# Patient Record
Sex: MALE | Race: OTHER | HISPANIC OR LATINO | URBAN - METROPOLITAN AREA
[De-identification: names, ages, dates, MRNs, and addresses within clinical notes are randomized per-mention and may not be internally consistent; named-entity substitution may affect disease eponyms.]

---

## 2022-08-12 ENCOUNTER — OUTPATIENT (OUTPATIENT)
Dept: OUTPATIENT SERVICES | Facility: HOSPITAL | Age: 27
LOS: 1 days | End: 2022-08-12
Payer: COMMERCIAL

## 2022-08-12 DIAGNOSIS — I10 ESSENTIAL (PRIMARY) HYPERTENSION: ICD-10-CM

## 2022-08-12 PROCEDURE — 93306 TTE W/DOPPLER COMPLETE: CPT

## 2022-08-12 PROCEDURE — 93306 TTE W/DOPPLER COMPLETE: CPT | Mod: 26

## 2022-11-09 PROBLEM — Z00.00 ENCOUNTER FOR PREVENTIVE HEALTH EXAMINATION: Status: ACTIVE | Noted: 2022-11-09

## 2022-12-22 DIAGNOSIS — E55.9 VITAMIN D DEFICIENCY, UNSPECIFIED: ICD-10-CM

## 2022-12-22 DIAGNOSIS — I42.9 CARDIOMYOPATHY, UNSPECIFIED: ICD-10-CM

## 2022-12-22 DIAGNOSIS — K76.0 FATTY (CHANGE OF) LIVER, NOT ELSEWHERE CLASSIFIED: ICD-10-CM

## 2022-12-22 DIAGNOSIS — I51.7 CARDIOMEGALY: ICD-10-CM

## 2022-12-22 DIAGNOSIS — R80.9 PROTEINURIA, UNSPECIFIED: ICD-10-CM

## 2022-12-22 DIAGNOSIS — I10 ESSENTIAL (PRIMARY) HYPERTENSION: ICD-10-CM

## 2022-12-22 DIAGNOSIS — E66.9 OBESITY, UNSPECIFIED: ICD-10-CM

## 2022-12-23 ENCOUNTER — NON-APPOINTMENT (OUTPATIENT)
Age: 27
End: 2022-12-23

## 2022-12-23 ENCOUNTER — APPOINTMENT (OUTPATIENT)
Dept: HEART FAILURE | Facility: CLINIC | Age: 27
End: 2022-12-23
Payer: COMMERCIAL

## 2022-12-23 VITALS
DIASTOLIC BLOOD PRESSURE: 110 MMHG | HEIGHT: 67 IN | WEIGHT: 265.5 LBS | HEART RATE: 94 BPM | TEMPERATURE: 99.6 F | SYSTOLIC BLOOD PRESSURE: 150 MMHG | BODY MASS INDEX: 41.67 KG/M2 | OXYGEN SATURATION: 96 %

## 2022-12-23 VITALS
HEART RATE: 98 BPM | SYSTOLIC BLOOD PRESSURE: 158 MMHG | OXYGEN SATURATION: 96 % | RESPIRATION RATE: 12 BRPM | DIASTOLIC BLOOD PRESSURE: 98 MMHG

## 2022-12-23 VITALS — DIASTOLIC BLOOD PRESSURE: 98 MMHG | SYSTOLIC BLOOD PRESSURE: 160 MMHG

## 2022-12-23 VITALS — SYSTOLIC BLOOD PRESSURE: 142 MMHG | DIASTOLIC BLOOD PRESSURE: 108 MMHG

## 2022-12-23 DIAGNOSIS — Z87.891 PERSONAL HISTORY OF NICOTINE DEPENDENCE: ICD-10-CM

## 2022-12-23 DIAGNOSIS — Z78.9 OTHER SPECIFIED HEALTH STATUS: ICD-10-CM

## 2022-12-23 DIAGNOSIS — I50.20 UNSPECIFIED SYSTOLIC (CONGESTIVE) HEART FAILURE: ICD-10-CM

## 2022-12-23 DIAGNOSIS — Z86.79 PERSONAL HISTORY OF OTHER DISEASES OF THE CIRCULATORY SYSTEM: ICD-10-CM

## 2022-12-23 DIAGNOSIS — Z82.49 FAMILY HISTORY OF ISCHEMIC HEART DISEASE AND OTHER DISEASES OF THE CIRCULATORY SYSTEM: ICD-10-CM

## 2022-12-23 DIAGNOSIS — Z83.3 FAMILY HISTORY OF DIABETES MELLITUS: ICD-10-CM

## 2022-12-23 PROCEDURE — 99205 OFFICE O/P NEW HI 60 MIN: CPT | Mod: 25

## 2022-12-23 PROCEDURE — 93000 ELECTROCARDIOGRAM COMPLETE: CPT

## 2022-12-23 RX ORDER — AMLODIPINE BESYLATE 5 MG/1
5 TABLET ORAL
Qty: 90 | Refills: 1 | Status: DISCONTINUED | COMMUNITY
End: 2022-12-23

## 2022-12-23 RX ORDER — SACUBITRIL AND VALSARTAN 24; 26 MG/1; MG/1
24-26 TABLET, FILM COATED ORAL TWICE DAILY
Qty: 180 | Refills: 3 | Status: ACTIVE | COMMUNITY
Start: 2022-12-23 | End: 1900-01-01

## 2022-12-23 RX ORDER — MULTIVIT-MIN/IRON/FOLIC ACID/K 18-600-40
CAPSULE ORAL
Refills: 0 | Status: ACTIVE | COMMUNITY

## 2022-12-23 RX ORDER — ENALAPRIL MALEATE 20 MG/1
20 TABLET ORAL
Qty: 90 | Refills: 1 | Status: DISCONTINUED | COMMUNITY
End: 2022-12-23

## 2022-12-23 NOTE — HISTORY OF PRESENT ILLNESS
[FreeTextEntry1] : 27-year-old male with HFrEF, longstanding hypertension since age of 12,and obesity presented as for initial heart failure evaluation.\par \par Cardiac history dates back to age of 12 where he was noted to have hypertension after a sports accident.  He has been on enalapril since then.  He notes having a full evaluation by nephrology and cardiology when he was younger for secondary hypertension which was negative.  He recalls having renal ultrasounds.  In 2017 however his PCP had retired and he was unable to get consistent treatment for his hypertension.  He had a hospitalization in June 2022 where he had an episode of palpitations for which he was restarted on hypertension medications.  He was connected with Dr. Clement Murray who is now his primary physician.  On work-up, patient underwent echocardiogram which revealed depressed ejection fraction to 27% in the setting of moderate hypertrophic cardiomyopathy (IVSd 1.45cm). He was referred to the heart failure clinic for additional evaluation.  \par \par Today, he reports no symptoms of fluid overload.  He denies chest pain, palpitations, lower extremity edema, dyspnea on exertion, orthopnea, bendopnea, and syncope.  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  He has no difficulties performing his everyday activities.\par \par He denies any extensive childhood illnesses.  He was born in the .  He has a family history of diabetes and hypertension his mother who lives in [place] and his father is healthy who lives locally.  He has a brother in [place] that is a emergency room physician.  He was a previous vap smoker (quit 2 years ago) and now will occassionally use THC to help him sleep after a night shift.  \par \par Of note, he did have COVID-19 in October 2022.  However he did not require hospitalization for this.  His last COVID-vaccine was December 2021.  He has not yet had his booster due to recent illness.\par \par Last blood work: 7/12/2022 CR 0.99, HbA1C 5.0, , TSH 1.5, Vit D low 11.5\par

## 2022-12-23 NOTE — CARDIOLOGY SUMMARY
[de-identified] : 12/23/22: NSR, TWI  I and AVL, no clear LVH voltage criteria. [de-identified] : Echo 8/12/2022: LVEF 30-35%, LVEDD 5.61cm, IVSd 1.45, PWT 1.36cm, normal RV function, trace MR and TR.

## 2022-12-23 NOTE — ASSESSMENT
[FreeTextEntry1] : 27-year-old male with HFrEF, longstanding hypertension since age of 12,and obesity presented as for initial heart failure evaluation.\par \par He has ACC/AHA stage B cardiomyopathy with NYHA class I symptoms.\par \par #HFrEF\par -Etiology unknown.  However, I suspect this is 2/2 hypertensive heart disease.  Will need to r/o other infiltrative cardiomyopaties as well as possible myocarditis from recent COVID-19 infeciton in October 2022. \par - Will obtain cardiac MRI as well as additional testing.  He already had a negative TSH in July of this year.  \par - will check CMP, CBC, proBNP, hepatitis panel, HIV panel, ADRIAN, double-stranded DNA, SPEP, light chains, ESR, and CRP.\par -Can consider genetics consult given his young age.\par \par -GDMT: Patient is currently on amlodipine 5 mg daily in and enalapril 20 mg daily.  I will adjust his medications as follows:\par STOP amlodipine, START carvedilol 6.25mg BID\par STOP enalapril, WAIT 2 DAYS then start on Entresto 24-26mg BID.\par \par -Diuretics: He is currently euvolemic on exam.  No role for standing diuretics at this time.\par \par -Device: Once on GDMT will assess LV function approximately 3 months to determine if ICD is warranted.\par \par - He was advised on the importance of abstaining for ETOH, smoking, marijuana use including edibles. \par \par F/U in January after blood work and cMRI.

## 2023-01-23 ENCOUNTER — APPOINTMENT (OUTPATIENT)
Dept: HEART FAILURE | Facility: CLINIC | Age: 28
End: 2023-01-23

## 2023-01-27 ENCOUNTER — LABORATORY RESULT (OUTPATIENT)
Age: 28
End: 2023-01-27

## 2023-01-30 LAB
ALBUMIN SERPL ELPH-MCNC: 4.3 G/DL
ALP BLD-CCNC: 37 U/L
ALT SERPL-CCNC: 37 U/L
ANION GAP SERPL CALC-SCNC: 10 MMOL/L
AST SERPL-CCNC: 29 U/L
BASOPHILS # BLD AUTO: 0.07 K/UL
BASOPHILS NFR BLD AUTO: 0.8 %
BILIRUB SERPL-MCNC: 0.9 MG/DL
BUN SERPL-MCNC: 16 MG/DL
CALCIUM SERPL-MCNC: 9.6 MG/DL
CHLORIDE SERPL-SCNC: 104 MMOL/L
CO2 SERPL-SCNC: 26 MMOL/L
CREAT SERPL-MCNC: 1.08 MG/DL
CRP SERPL HS-MCNC: 0.94 MG/L
DEPRECATED KAPPA LC FREE/LAMBDA SER: 1.42 RATIO
DSDNA AB SER-ACNC: <12 IU/ML
EGFR: 96 ML/MIN/1.73M2
EOSINOPHIL # BLD AUTO: 0.2 K/UL
EOSINOPHIL NFR BLD AUTO: 2.3 %
ERYTHROCYTE [SEDIMENTATION RATE] IN BLOOD BY WESTERGREN METHOD: 14 MM/HR
GLUCOSE SERPL-MCNC: 75 MG/DL
HAV IGM SER QL: NONREACTIVE
HBV CORE IGM SER QL: NONREACTIVE
HBV SURFACE AG SER QL: NONREACTIVE
HCT VFR BLD CALC: 49 %
HCV AB SER QL: NONREACTIVE
HCV S/CO RATIO: 0.08 S/CO
HGB BLD-MCNC: 16.5 G/DL
IMM GRANULOCYTES NFR BLD AUTO: 0.5 %
KAPPA LC CSF-MCNC: 1.18 MG/DL
KAPPA LC SERPL-MCNC: 1.68 MG/DL
LYMPHOCYTES # BLD AUTO: 3.67 K/UL
LYMPHOCYTES NFR BLD AUTO: 42.5 %
MAN DIFF?: NORMAL
MCHC RBC-ENTMCNC: 32.1 PG
MCHC RBC-ENTMCNC: 33.7 GM/DL
MCV RBC AUTO: 95.3 FL
MONOCYTES # BLD AUTO: 0.75 K/UL
MONOCYTES NFR BLD AUTO: 8.7 %
NEUTROPHILS # BLD AUTO: 3.91 K/UL
NEUTROPHILS NFR BLD AUTO: 45.2 %
NT-PROBNP SERPL-MCNC: 16 PG/ML
PLATELET # BLD AUTO: 310 K/UL
POTASSIUM SERPL-SCNC: 4.7 MMOL/L
PROT SERPL-MCNC: 7.2 G/DL
RBC # BLD: 5.14 M/UL
RBC # FLD: 13.4 %
SODIUM SERPL-SCNC: 140 MMOL/L
WBC # FLD AUTO: 8.64 K/UL

## 2023-01-31 LAB — ANA SER IF-ACNC: NEGATIVE

## 2023-02-01 LAB
ALBUMIN MFR SERPL ELPH: 57.6 %
ALBUMIN SERPL-MCNC: 4.3 G/DL
ALBUMIN/GLOB SERPL: 1.3 RATIO
ALPHA1 GLOB MFR SERPL ELPH: 3.4 %
ALPHA1 GLOB SERPL ELPH-MCNC: 0.3 G/DL
ALPHA2 GLOB MFR SERPL ELPH: 9.2 %
ALPHA2 GLOB SERPL ELPH-MCNC: 0.7 G/DL
B-GLOBULIN MFR SERPL ELPH: 14.1 %
B-GLOBULIN SERPL ELPH-MCNC: 1.1 G/DL
GAMMA GLOB FLD ELPH-MCNC: 1.2 G/DL
GAMMA GLOB MFR SERPL ELPH: 15.7 %
INTERPRETATION SERPL IEP-IMP: NORMAL
PROT SERPL-MCNC: 7.5 G/DL
PROT SERPL-MCNC: 7.5 G/DL

## 2023-09-26 ENCOUNTER — RX RENEWAL (OUTPATIENT)
Age: 28
End: 2023-09-26

## 2023-09-26 RX ORDER — CARVEDILOL 6.25 MG/1
6.25 TABLET, FILM COATED ORAL TWICE DAILY
Qty: 180 | Refills: 1 | Status: ACTIVE | COMMUNITY
Start: 2022-12-23 | End: 1900-01-01